# Patient Record
Sex: MALE | Race: WHITE | NOT HISPANIC OR LATINO
[De-identification: names, ages, dates, MRNs, and addresses within clinical notes are randomized per-mention and may not be internally consistent; named-entity substitution may affect disease eponyms.]

---

## 2019-11-21 ENCOUNTER — TRANSCRIPTION ENCOUNTER (OUTPATIENT)
Age: 44
End: 2019-11-21

## 2022-07-05 ENCOUNTER — NON-APPOINTMENT (OUTPATIENT)
Age: 47
End: 2022-07-05

## 2023-05-15 PROBLEM — Z00.00 ENCOUNTER FOR PREVENTIVE HEALTH EXAMINATION: Status: ACTIVE | Noted: 2023-05-15

## 2023-05-22 ENCOUNTER — APPOINTMENT (OUTPATIENT)
Dept: ORTHOPEDIC SURGERY | Facility: CLINIC | Age: 48
End: 2023-05-22
Payer: COMMERCIAL

## 2023-05-22 DIAGNOSIS — Z78.9 OTHER SPECIFIED HEALTH STATUS: ICD-10-CM

## 2023-05-22 DIAGNOSIS — S83.412A SPRAIN OF MEDIAL COLLATERAL LIGAMENT OF LEFT KNEE, INITIAL ENCOUNTER: ICD-10-CM

## 2023-05-22 PROCEDURE — 99203 OFFICE O/P NEW LOW 30 MIN: CPT

## 2023-05-22 PROCEDURE — 73562 X-RAY EXAM OF KNEE 3: CPT | Mod: LT

## 2023-05-22 NOTE — HISTORY OF PRESENT ILLNESS
[9] : 9 [Sharp] : sharp [de-identified] : 5-22-23- 2 week history of medially based left knee pain that came on after reffing soccer games. Pain with start up and lateral movements. states feeling of instability. 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

## 2023-05-22 NOTE — ASSESSMENT
[FreeTextEntry1] : -medially based left knee pain and feeling of instability that came on after athletics. Failed conservative nsaids and rest, + maya and pain with extension.\par will get stat mri evaluate medial meniscal tear and f/u after mri

## 2023-05-22 NOTE — PHYSICAL EXAM
[5___] : hamstring 5[unfilled]/5 [Positive] : positive Florencio [] : ligamentously not stable [Left] : left knee [AP] : anteroposterior [Lateral] : lateral [There are no fractures, subluxations or dislocations. No significant abnormalities are seen] : There are no fractures, subluxations or dislocations. No significant abnormalities are seen [TWNoteComboBox7] : flexion 120 degrees [de-identified] : extension 5 degrees

## 2023-05-23 ENCOUNTER — RESULT REVIEW (OUTPATIENT)
Age: 48
End: 2023-05-23

## 2023-05-25 ENCOUNTER — APPOINTMENT (OUTPATIENT)
Dept: ORTHOPEDIC SURGERY | Facility: CLINIC | Age: 48
End: 2023-05-25
Payer: COMMERCIAL

## 2023-05-25 DIAGNOSIS — M23.92 UNSPECIFIED INTERNAL DERANGEMENT OF LEFT KNEE: ICD-10-CM

## 2023-05-25 PROCEDURE — 99213 OFFICE O/P EST LOW 20 MIN: CPT

## 2023-05-25 NOTE — PHYSICAL EXAM
[5___] : hamstring 5[unfilled]/5 [Positive] : positive Florencio [] : patient ambulates without assistive device [Left] : left knee [AP] : anteroposterior [Lateral] : lateral [There are no fractures, subluxations or dislocations. No significant abnormalities are seen] : There are no fractures, subluxations or dislocations. No significant abnormalities are seen [TWNoteComboBox7] : flexion 120 degrees [de-identified] : extension 5 degrees

## 2023-05-25 NOTE — ASSESSMENT
[FreeTextEntry1] : Mri reviewed, the pain he is experiencing is from the bone bruise. This will heal on its own, may take up to 3 months. Activity modifications: no running/jumping until pain free discussed. Non impact exercises ok

## 2023-05-25 NOTE — HISTORY OF PRESENT ILLNESS
[de-identified] : 5-22-23- 2 week history of medially based left knee pain that came on after reffing soccer games. Pain with start up and lateral movements. states feeling of instability. 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 game and after he got up from a chair and felt pain in his lt knee \par pt states no specific injury \par

## 2023-05-25 NOTE — REASON FOR VISIT
[FreeTextEntry2] : 5/25/23- Had MRI: bone bruising medial tibia, shallow incomplete tear medial meniscus